# Patient Record
Sex: FEMALE | Race: WHITE | URBAN - METROPOLITAN AREA
[De-identification: names, ages, dates, MRNs, and addresses within clinical notes are randomized per-mention and may not be internally consistent; named-entity substitution may affect disease eponyms.]

---
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
## 2019-06-13 NOTE — SUMMARY OF CARE
Patient search completed by RN; pt wearing scrub top, pt wanded with metal detector and belongings given to security to be stored. Explained to patient that they would be evaluated by a nurse here in the ER and then would go over to the [facility] when a bed is available where they will meet with a doctor and an accessor; plan will be determined from there.   
Numbness/ tingling bilateral  feet
